# Patient Record
Sex: FEMALE | Race: WHITE | NOT HISPANIC OR LATINO | Employment: FULL TIME | URBAN - METROPOLITAN AREA
[De-identification: names, ages, dates, MRNs, and addresses within clinical notes are randomized per-mention and may not be internally consistent; named-entity substitution may affect disease eponyms.]

---
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
## 2025-07-28 ENCOUNTER — APPOINTMENT (OUTPATIENT)
Dept: RADIOLOGY | Facility: IMAGING CENTER | Age: 20
End: 2025-07-28
Attending: STUDENT IN AN ORGANIZED HEALTH CARE EDUCATION/TRAINING PROGRAM
Payer: COMMERCIAL

## 2025-07-28 ENCOUNTER — OCCUPATIONAL MEDICINE (OUTPATIENT)
Dept: URGENT CARE | Facility: CLINIC | Age: 20
End: 2025-07-28
Payer: COMMERCIAL

## 2025-07-28 VITALS
HEIGHT: 64 IN | SYSTOLIC BLOOD PRESSURE: 106 MMHG | DIASTOLIC BLOOD PRESSURE: 62 MMHG | BODY MASS INDEX: 20.49 KG/M2 | HEART RATE: 74 BPM | WEIGHT: 120 LBS | TEMPERATURE: 97.5 F | RESPIRATION RATE: 14 BRPM | OXYGEN SATURATION: 98 %

## 2025-07-28 DIAGNOSIS — L08.9 INFECTED PUNCTURE WOUND OF HAND, RIGHT, INITIAL ENCOUNTER: ICD-10-CM

## 2025-07-28 DIAGNOSIS — S61.431A INFECTED PUNCTURE WOUND OF HAND, RIGHT, INITIAL ENCOUNTER: ICD-10-CM

## 2025-07-28 DIAGNOSIS — S69.91XA HAND INJURY, RIGHT, INITIAL ENCOUNTER: ICD-10-CM

## 2025-07-28 DIAGNOSIS — S69.91XA HAND INJURY, RIGHT, INITIAL ENCOUNTER: Primary | ICD-10-CM

## 2025-07-28 DIAGNOSIS — S61.411A LACERATION OF RIGHT HAND WITHOUT FOREIGN BODY, INITIAL ENCOUNTER: ICD-10-CM

## 2025-07-28 PROCEDURE — 73130 X-RAY EXAM OF HAND: CPT | Mod: TC,RT | Performed by: RADIOLOGY

## 2025-07-28 RX ORDER — SULFAMETHOXAZOLE AND TRIMETHOPRIM 800; 160 MG/1; MG/1
2 TABLET ORAL 2 TIMES DAILY
Qty: 20 TABLET | Refills: 0 | Status: SHIPPED | OUTPATIENT
Start: 2025-07-28 | End: 2025-08-02

## 2025-07-28 ASSESSMENT — FIBROSIS 4 INDEX: FIB4 SCORE: 0.33

## 2025-07-28 NOTE — LETTER
"  EMPLOYEE’S CLAIM FOR COMPENSATION/ REPORT OF INITIAL TREATMENT  FORM C-4  PLEASE TYPE OR PRINT    EMPLOYEE’S CLAIM - PROVIDE ALL INFORMATION REQUESTED   First Name                    RAMEZ Gerardo                  Last Name  Francis Birthdate                    2005                Sex  [x]Female Claim Number (Insurer’s Use Only)     Mailing Address  [address] Age  19 y.o. 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 (if applicable)  Clearing table with broken glass    How did this injury or occupational disease occur? (Be specific and answer in detail. Use additional sheet if necessary)  Clearing a table   If you believe that you have an occupational disease, when did you first have knowledge of the disability and its relationship to your employment?  n/a Witnesses to the Accident (if applicable)  none      Nature of Injury or Occupational Disease  Laceration  Part(s) of Body Injured or Affected  Hand (R) N/A N/A    I CERTIFY THAT THE ABOVE IS TRUE AND " CORRECT TO T HE BEST OF MY KNOWLEDGE AND THAT I HAVE PROVIDED THIS INFORMATION IN ORDER TO OBTAIN THE BENEFITS OF [place]CC[place], OR CHAPTER 617 OF [place]).  I HEREBY AUTHORIZE ANY PHYSICIAN, CHIROPRACTOR, SURGEON, PRACTITIONER OR ANY OTHER PERSON, ANY HOSPITAL, INCLUDING [facility] OR [facility], ANY  MEDICAL SERVICE ORGANIZATION, ANY INSURANCE COMPANY, OR OTHER INSTITUTION OR ORGANIZATION TO RELEASE TO EACH OTHER, ANY MEDICAL OR OTHER INFORMATION, INCLUDING BENEFITS PAID OR PAYABLE, PERTINENT TO THIS INJURY OR DISEASE, EXCEPT INFORMATION RELATIVE TO DIAGNOSIS, TREATMENT AND/OR COUNSELING FOR AIDS, PSYCHOLOGICAL CONDITIONS, ALCOHOL OR CONTROLLED SUBSTANCES, FOR WHICH I MUST GIVE SPECIFIC AUTHORIZATION.  A PHOTOSTAT OF THIS AUTHORIZATION SHALL BE VALID AS THE ORIGINAL.     Date   Place Employee’s Original or  *Electronic Signature   THIS REPORT MUST BE COMPLETED AND MAILED WITHIN 3 WORKING DAYS OF TREATMENT   Place  [facility]    Name of [facility]   Date 7/28/2025 Diagnosis and Description of Injury or Occupational Disease  (S69.91XA) Hand injury, right, initial encounter  (primary encounter diagnosis)  The encounter diagnosis was Hand injury, right, initial encounter. Is there evidence that the injured employee was under the influence of alcohol and/or another controlled substance at the time of accident?  []No  [] Yes (if yes, please explain)   Hour 7:54 PM  No   Treatment: Bactrim prescribed. Recommend follow up in 4 days to reevaluate wound and to ensure it is healing as expected. Educated on proper dressing of the area.    Have you advised the patient to remain off work five days or more?   No  [] Yes  If yes, indicate dates: From_ _                                                      To __ _  [] No   If no, is the injured employee capable of: [] full duty Yes   [] modified duty      If modified  duty, specify any limitations / restrictions:__________________  ___ ___________________________     X-Ray Findings: Negative    From information given by the employee, together with medical evidence, can you directly connect this injury or occupational disease as job incurred?  []Yes   [] No Yes    Is additional medical care by a physician indicated? []Yes [] No  Yes    Do you know of any previous injury or disease contributing to this condition or occupational disease? []Yes [] No (Explain if yes)                          No   Date  7/28/2025 Print Health Care Provider’s Name  Chaitanya Jason M.D. I certify that the employer’s copy of  this form was delivered to the employer on:   Address  [address] [place] [address] INSURER'S USE ONLY                       [facility] Zip  [address] Provider’s Tax ID Number  [id number]   Telephone  Dept: [phone]    Health Care Provider’s Original or Electronic Signature      e-CHAITANYA Bertrand M.D.    Degree (MD,DO, DC,PA-C,APRN)  MD  Choose (if applicable)      ORIGINAL - TREATING HEALTHCARE PROVIDER PAGE 2 - INSURER/TPA PAGE 3 - EMPLOYER PAGE 4 - EMPLOYEE             Form C-4 (rev.02/25)

## 2025-07-28 NOTE — LETTER
PHYSICIAN’S AND CHIROPRACTIC PHYSICIAN'S   PROGRESS REPORT   CERTIFICATION OF DISABILITY Claim Number:     Social Security Number:    Patient’s Name: Humaira Blanco Date of Injury: 7/25/2025   Employer: [employer] Name of [employer]CO (if applicable):      Patient’s Job Description/Occupation: Host       Previous Injuries/Diseases/Surgeries Contributing to the Condition:  No      Diagnosis: (S69.91XA) Hand injury, right, initial encounter  (primary encounter diagnosis)      Related to the Industrial Injury? Yes     Explain: 2 days ago, the patient was cleaning a table after a glass that broke on that same table that she did not know about.  She was using a washcloth to wipe the table when a small piece of glass cut her hand.       Objective Medical Findings: 1 cm area of redness and mild swelling with a central pustule noted.  Tender to touch.  No range of motion changes.  No induration noted.         None - Discharged                         Stable  No                 Ratable  No        Generally Improved                         Condition Worsened               X   Condition Same  May Have Suffered a Permanent Disability No     Treatment Plan:    Bactrim prescribed.  Wound was dressed.  Educated on supportive care.  Recommend follow-up in 4 days for wound reevaluation to ensure that it is healing as expected.  No work restrictions needed.         No Change in Therapy                  PT/OT Prescribed                      Medication May be Used While Working        Case Management                          PT/OT Discontinued    Consultation    Further Diagnostic Studies:    Prescription(s)               X  Released to FULL DUTY /No Restrictions on (Date):       Certified TOTALLY TEMPORARILY DISABLED (Indicate Dates) From:   To:      Released to RESTRICTED/Modified Duty on (Date): From:   To:    Restrictions Are:         No Sitting    No Standing    No Pulling Other:         No Bending at Waist     No Stooping     No  Lifting        No Carrying     No Walking Lifting Restricted to (lbs.):          No Pushing        No Climbing     No Reaching Above Shoulders       Date of Next Visit:  7/28/2025 Date of this Exam: 7/28/2025 Physician/Chiropractic Physician Name: Chaitanya Jason M.D. Physician/Chiropractic Physician Signature:  Micheal Roth DO MPH     [place]:  [address] [place] [address], [address] [place], [place] [address] - Telephone [phone] [place]:  [address] [place] [address], [address] [place], [place] [address] - Telephone [phone]    [email or web address]  D-39 (Rev. 10/24)

## 2025-07-29 NOTE — PROGRESS NOTES
"Worker's Compensation  New Patient Visit Note  [facility]    07/28/25    Humaira Blanco     [address] [place] [address]     PCP: Kira Drew     Subjective:     Chief Complaint   Patient presents with    Other     Doi 07/25 , cut on hand, believes that there is glass still in there , hand pain (right side)       HPI:    DOI: 7/26/25    Employer: [employer]    Position:     Humaira Blanco is a 19 y.o. female who presents for a right hand injury.  2 days ago, the patient was cleaning a table after a glass that broke on that same table that she did not know about.  She was using a washcloth to wipe the table when a small piece of glass cut her hand.  She reports that the hand is becoming more painful in the area and slightly red and swollen with a small amount of discharge, prompting her visit here.    ROS:  ROS     CURRENT MEDICATIONS:  Encounter Medications with Dispense Information[1]    ALLERGIES:   Allergies[2]    PROBLEM LIST:    does not have any pertinent problems on file.    Allergies, Medications, & Tobacco/Substance Use were reconciled by the Medical Assistant and reviewed by myself.     Objective:     /62   Pulse 74   Temp 36.4 °C (97.5 °F) (Temporal)   Resp 14   Ht 1.626 m (5' 4\")   Wt 54.4 kg (120 lb)   SpO2 98%   BMI 20.60 kg/m²     Physical Exam  HENT:      Head: Normocephalic and atraumatic.      Right Ear: External ear normal.      Left Ear: External ear normal.      Nose: Nose normal.      Mouth/Throat:      Mouth: Mucous membranes are moist.   Eyes:      General:         Right eye: No discharge.         Left eye: No discharge.      Extraocular Movements: Extraocular movements intact.      Pupils: Pupils are equal, round, and reactive to light.   Cardiovascular:      Rate and Rhythm: Normal rate and regular rhythm.      Pulses: Normal pulses.   Pulmonary:      Effort: Pulmonary effort is normal.      Breath sounds: Normal breath sounds.   Abdominal:      " General: Abdomen is flat.   Musculoskeletal:        Hands:       Comments: Roughly 1 cm area of erythema with a central pustule.  Tender to touch.   Skin:     General: Skin is warm.      Capillary Refill: Capillary refill takes less than 2 seconds.   Neurological:      Mental Status: She is alert and oriented to person, place, and time.   Psychiatric:         Mood and Affect: Mood normal.         Behavior: Behavior normal.           Lab Results/POC Test Results   Results for orders placed or performed in visit on 08/14/24   POCT CoV-2, Flu A/B, RSV by PCR    Collection Time: 08/14/24  2:28 PM   Result Value Ref Range    SARS-CoV-2 by PCR Negative Negative, Invalid    Influenza virus A RNA Negative Negative, Invalid    Influenza virus B, PCR Negative Negative, Invalid    RSV, PCR Negative Negative, Invalid   POCT Pregnancy    Collection Time: 08/14/24  2:32 PM   Result Value Ref Range    POC Urine Pregnancy Test Negative     Internal Control Positive Positive     Internal Control Negative Negative            DX-HAND 3+ RIGHT  Result Date: 7/28/2025 7/28/2025 8:11 PM HISTORY/REASON FOR EXAM:  Right hand laceration TECHNIQUE/EXAM DESCRIPTION:  AP, lateral, and oblique views of the RIGHT hand. COMPARISON:  None FINDINGS: The bony structures and articulations are within normal limits.  No fracture, subluxation, or dislocation is appreciated.     1.  No acute traumatic bony injury.         Assessment/Plan:     Patient's history and physical exam consistent with:    Assessment & Plan  Hand injury, right, initial encounter    Orders:    DX-HAND 3+ RIGHT; Future    Laceration of right hand without foreign body, initial encounter    Orders:    sulfamethoxazole-trimethoprim (BACTRIM DS) 800-160 MG tablet; Take 2 Tablets by mouth 2 times a day for 5 days.    Infected puncture wound of hand, right, initial encounter    Orders:    sulfamethoxazole-trimethoprim (BACTRIM DS) 800-160 MG tablet; Take 2 Tablets by mouth 2 times a  day for 5 days.    Patient presents with a small right hand laceration that appears to be mildly infected.  No signs of glass foreign body appreciated on x-ray.  Generally speaking, x-rays will reveal glass foreign bodies but not always so it is important that the patient monitor the area closely, especially if it is not healing as expected.  Offered exploration of the area but the patient refused at this time.  Prescribed antibiotics empirically.  Follow-up in 4 days for wound reevaluation to ensure that it is healing as expected.  No work restrictions or time off needed.    See D39 and C4 paperwork for further details.        I personally reviewed prior external notes and test results pertinent to today's visit. I have independently reviewed and interpreted all diagnostics ordered during this visit.    Please note that this dictation was created using voice recognition software. I have made a reasonable attempt to correct obvious errors, but I expect that there are errors of grammar and possibly content that I did not discover before finalizing the note.    This note was electronically signed by Chaitanya Jason MD               [1]   Current Outpatient Medications   Medication Sig Refill Last Dispense    propranolol LA (INDERAL LA) 60 MG CAPSULE SR 24 HR TAKE 1 CAPSULE BY MOUTH EVERY DAY. FOLLOW-UP FOR REFILL (Patient not taking: Reported on 7/28/2025) 1 Unknown (outside pharmacy)    SUMAtriptan (IMITREX) 25 MG Tab tablet TAKE 1 TABLET BY MOUTH ONE TIME AS NEEDED FOR MIGRAINE FOR UP TO 1 DOSE 1 Unknown (outside pharmacy)   [2]   Allergies  Allergen Reactions    Hydrocodone Vomiting

## 2025-07-29 NOTE — PATIENT INSTRUCTIONS
Thank you for trusting [facility] for your medical care today. You were seen by Chaitanya Jason MD. We hope that we were able to answer all of your questions, alleviate concerns, and create a plan going forward. If you need anything from us, don't hesitate to reach out.     If you develop any new or worsening symptoms that you believe need urgent or emergent evaluation, be sure to be reevaluated in urgent care or the emergency room.     Chaitanya Jason MD  Urgent Care

## 2025-08-14 ENCOUNTER — OFFICE VISIT (OUTPATIENT)
Dept: MEDICAL GROUP | Facility: LAB | Age: 20
End: 2025-08-14
Payer: COMMERCIAL

## 2025-08-14 VITALS
DIASTOLIC BLOOD PRESSURE: 56 MMHG | BODY MASS INDEX: 21.05 KG/M2 | HEART RATE: 73 BPM | RESPIRATION RATE: 16 BRPM | WEIGHT: 123.3 LBS | TEMPERATURE: 97.7 F | SYSTOLIC BLOOD PRESSURE: 90 MMHG | HEIGHT: 64 IN

## 2025-08-14 DIAGNOSIS — F33.2 SEVERE EPISODE OF RECURRENT MAJOR DEPRESSIVE DISORDER, WITHOUT PSYCHOTIC FEATURES (HCC): ICD-10-CM

## 2025-08-14 DIAGNOSIS — Z91.52 HISTORY OF NON-SUICIDAL SELF-HARM: ICD-10-CM

## 2025-08-14 DIAGNOSIS — F41.0 SEVERE ANXIETY WITH PANIC: ICD-10-CM

## 2025-08-14 DIAGNOSIS — Z91.410 PERSONAL HISTORY OF ADULT PHYSICAL AND SEXUAL ABUSE: ICD-10-CM

## 2025-08-14 DIAGNOSIS — Z91.51 HISTORY OF SUICIDE ATTEMPT: ICD-10-CM

## 2025-08-14 PROBLEM — F41.9 ANXIETY: Status: ACTIVE | Noted: 2025-08-14

## 2025-08-14 PROCEDURE — 3074F SYST BP LT 130 MM HG: CPT | Performed by: STUDENT IN AN ORGANIZED HEALTH CARE EDUCATION/TRAINING PROGRAM

## 2025-08-14 PROCEDURE — 99214 OFFICE O/P EST MOD 30 MIN: CPT | Performed by: STUDENT IN AN ORGANIZED HEALTH CARE EDUCATION/TRAINING PROGRAM

## 2025-08-14 PROCEDURE — 3078F DIAST BP <80 MM HG: CPT | Performed by: STUDENT IN AN ORGANIZED HEALTH CARE EDUCATION/TRAINING PROGRAM

## 2025-08-14 RX ORDER — ESCITALOPRAM OXALATE 10 MG/1
10 TABLET ORAL DAILY
Qty: 90 TABLET | Refills: 0 | Status: SHIPPED | OUTPATIENT
Start: 2025-08-14

## 2025-08-14 ASSESSMENT — ENCOUNTER SYMPTOMS
NAUSEA: 0
SHORTNESS OF BREATH: 0
COUGH: 0
DIARRHEA: 0
NERVOUS/ANXIOUS: 1
FEVER: 0
ABDOMINAL PAIN: 0
VOMITING: 0
DEPRESSION: 1
CHILLS: 0
WEIGHT LOSS: 0

## 2025-08-14 ASSESSMENT — LIFESTYLE VARIABLES
HOW OFTEN DO YOU HAVE SIX OR MORE DRINKS ON ONE OCCASION: NEVER
SKIP TO QUESTIONS 9-10: 1
HOW MANY STANDARD DRINKS CONTAINING ALCOHOL DO YOU HAVE ON A TYPICAL DAY: 1 OR 2
AUDIT-C TOTAL SCORE: 1
HOW OFTEN DO YOU HAVE A DRINK CONTAINING ALCOHOL: MONTHLY OR LESS

## 2025-08-14 ASSESSMENT — ANXIETY QUESTIONNAIRES
7. FEELING AFRAID AS IF SOMETHING AWFUL MIGHT HAPPEN: MORE THAN HALF THE DAYS
1. FEELING NERVOUS, ANXIOUS, OR ON EDGE: NEARLY EVERY DAY
6. BECOMING EASILY ANNOYED OR IRRITABLE: NEARLY EVERY DAY
2. NOT BEING ABLE TO STOP OR CONTROL WORRYING: NEARLY EVERY DAY
GAD7 TOTAL SCORE: 20
4. TROUBLE RELAXING: NEARLY EVERY DAY
3. WORRYING TOO MUCH ABOUT DIFFERENT THINGS: NEARLY EVERY DAY
5. BEING SO RESTLESS THAT IT IS HARD TO SIT STILL: NEARLY EVERY DAY

## 2025-08-14 ASSESSMENT — PATIENT HEALTH QUESTIONNAIRE - PHQ9
CLINICAL INTERPRETATION OF PHQ2 SCORE: 4
SUM OF ALL RESPONSES TO PHQ QUESTIONS 1-9: 22
5. POOR APPETITE OR OVEREATING: 3 - NEARLY EVERY DAY

## 2025-08-14 ASSESSMENT — FIBROSIS 4 INDEX: FIB4 SCORE: 0.33
